# Patient Record
Sex: MALE | Race: WHITE | HISPANIC OR LATINO | Employment: FULL TIME | URBAN - METROPOLITAN AREA
[De-identification: names, ages, dates, MRNs, and addresses within clinical notes are randomized per-mention and may not be internally consistent; named-entity substitution may affect disease eponyms.]

---

## 2017-04-20 ENCOUNTER — OCCUPATIONAL MEDICINE (OUTPATIENT)
Dept: OCCUPATIONAL MEDICINE | Facility: CLINIC | Age: 27
End: 2017-04-20
Payer: OTHER GOVERNMENT

## 2017-04-20 ENCOUNTER — APPOINTMENT (OUTPATIENT)
Dept: OCCUPATIONAL MEDICINE | Facility: CLINIC | Age: 27
End: 2017-04-20
Payer: COMMERCIAL

## 2017-04-20 ENCOUNTER — APPOINTMENT (OUTPATIENT)
Dept: URGENT CARE | Facility: CLINIC | Age: 27
End: 2017-04-20
Payer: COMMERCIAL

## 2017-04-20 VITALS
HEIGHT: 72 IN | TEMPERATURE: 98.2 F | RESPIRATION RATE: 16 BRPM | OXYGEN SATURATION: 97 % | WEIGHT: 224 LBS | DIASTOLIC BLOOD PRESSURE: 82 MMHG | HEART RATE: 54 BPM | SYSTOLIC BLOOD PRESSURE: 124 MMHG | BODY MASS INDEX: 30.34 KG/M2

## 2017-04-20 DIAGNOSIS — S93.401A SPRAIN OF RIGHT ANKLE, UNSPECIFIED LIGAMENT, INITIAL ENCOUNTER: ICD-10-CM

## 2017-04-20 DIAGNOSIS — Z02.1 PRE-EMPLOYMENT DRUG SCREENING: ICD-10-CM

## 2017-04-20 LAB
AMP AMPHETAMINE: NORMAL
BAR BARBITURATES: NORMAL
BREATH ALCOHOL COMMENT: NORMAL
BZO BENZODIAZEPINES: NORMAL
COC COCAINE: NORMAL
INT CON NEG: NORMAL
INT CON POS: NORMAL
MDMA ECSTASY: NORMAL
MET METHAMPHETAMINES: NORMAL
MTD METHADONE: NORMAL
OPI OPIATES: NORMAL
OXY OXYCODONE: NORMAL
PCP PHENCYCLIDINE: NORMAL
POC BREATHALIZER: 0 PERCENT (ref 0–0.01)
POC URINE DRUG SCREEN OCDRS: NEGATIVE
THC: NORMAL

## 2017-04-20 PROCEDURE — E0114 CRUTCH UNDERARM PAIR NO WOOD: HCPCS | Mod: NU | Performed by: PREVENTIVE MEDICINE

## 2017-04-20 PROCEDURE — 82075 ASSAY OF BREATH ETHANOL: CPT | Performed by: PREVENTIVE MEDICINE

## 2017-04-20 PROCEDURE — 80305 DRUG TEST PRSMV DIR OPT OBS: CPT | Performed by: PREVENTIVE MEDICINE

## 2017-04-20 PROCEDURE — L4350 ANKLE CONTROL ORTHO PRE OTS: HCPCS | Mod: RT | Performed by: PREVENTIVE MEDICINE

## 2017-04-20 PROCEDURE — 99203 OFFICE O/P NEW LOW 30 MIN: CPT | Performed by: PREVENTIVE MEDICINE

## 2017-04-20 NOTE — PROGRESS NOTES
Subjective:      Leonard De La Vega is a 26 y.o. male who presents with Other      DOI 4/20/2017: Patient stepped out of work truck and caught his foot on the side walk and sprained his ankle. Patient noted immediate pain in the anterior aspect of the ankle. In mild swelling. Stated that he is able to ambulate initially but has not been able to put much weight on it since. He denies previous ankle injury. States the pain is constant and throbbing. Pain is worse with putting any weight on the foot. He has not taken any medications or tried any treatment. Denies any numbness or tingling.     Other        ROS  ROS: All systems were reviewed on intake form, form was reviewed and signed. See scanned documents in media. Pertinent positives and negatives included in HPI.    PMH: No pertinent past medical history to this problem  MEDS: Medications were reviewed in Epic  ALLERGIES: No Known Allergies  SOCHX: Works as Maintenance at [employer]  FH: No pertinent family history to this problem       Objective:     /82 mmHg  Pulse 54  Temp(Src) 36.8 °C (98.2 °F)  Resp 16  Ht 1.829 m (6')  Wt 101.606 kg (224 lb)  BMI 30.37 kg/m2  SpO2 97%     Physical Exam   Constitutional: He is oriented to person, place, and time. He appears well-developed and well-nourished.   Eyes: Conjunctivae and EOM are normal.   Cardiovascular: Normal rate.    Pulmonary/Chest: Effort normal. No respiratory distress.   Neurological: He is alert and oriented to person, place, and time.   Skin: Skin is warm and dry. No erythema.   Psychiatric: He has a normal mood and affect. Judgment normal.   Vitals reviewed.      Right ankle: There is small area of ecchymosis and swelling at the anterior aspect of the ankle. Tenderness to palpation in this area. No tenderness to palpation lateral malleolus, medial malleolus or fifth metatarsal. Full plantar flexion, but decreased dorsiflexion. Pain with plantar and dorsiflexion. No laxity with drawer  tests. Only able to provide a small amount of weight on foot.       Assessment/Plan:     1. Sprain of right ankle, unspecified ligament, initial encounter  No XR, did not meet [place] criteria  RICE treatment. Elevate and ice at least 4 times daily for the next few days.  Provided crutches and lace up ankle brace. Weight-bear as tolerated. Wean off crutches as soon as possible  OTC ibuprofen as needed for pain  Follow-up next Tuesday  Restricted duty

## 2017-04-20 NOTE — Clinical Note
EMPLOYEE’S CLAIM FOR COMPENSATION/ REPORT OF INITIAL TREATMENT  FORM C-4    EMPLOYEE’S CLAIM - PROVIDE ALL INFORMATION REQUESTED   First Name  Leonard Last Name  Ceferino Birthdate                    1990                Sex  male Claim Number   Home Address  [address] Age  26 y.o. 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 (if applicable)  getting ready for lunch    How did this injury or occupational disease occur? (Be specific an answer in detail. Use additional sheet if necessary)  I was rad reyes to go to lunch when step out of the truck, I step wrong on the side walk   If you believe that you have an occupational disease, when did you first have knowledge of the disability and it relationship to your employment?  none Witnesses to the Accident  [place]      Nature of Injury or Occupational Disease  Sprain  Part(s) of Body Injured or Affected  Foot (R), ,     I certify that the above is true and correct to the best of my knowledge and that I have provided this information in order to obtain the benefits of [place]  [place] or [place]).  I hereby authorize any physician, chiropractor, surgeon, practitioner, or other person, any hospital, including [facility] or [facility] hospital, any medical service organization, any insurance company, or other institution or organization to release to each other, any medical or other information, including benefits paid or payable, pertinent to this injury or disease, except information relative to diagnosis, treatment and/or counseling for AIDS, psychological conditions, alcohol or controlled substances, for which I must give specific authorization.  A Photostat of this authorization shall be as valid as the original.     Date   Place   Employee’s Signature   THIS REPORT MUST BE COMPLETED AND MAILED WITHIN 3 WORKING DAYS OF TREATMENT   Place  [facility]  Name of [facility]   Date  4/20/2017 Diagnosis  (S93.401A) Sprain of right ankle, unspecified ligament, initial encounter Is there evidence the injured employee was under the influence of alcohol and/or another controlled substance at the time of accident?   Hour  2:07 PM Description of Injury or Disease  The encounter diagnosis was Sprain of right ankle, unspecified ligament, initial encounter. No   Treatment  RICE, ibuprofen, crutches, ankle brace  Have you advised the patient to remain off work five days or more? No   X-Ray Findings      If Yes   From Date  To Date      From information given by the employee, together with medical evidence, can you directly connect this injury or occupational disease as job incurred?  Yes If No Full Duty  No Modified Duty  Yes   Is additional medical care by a physician indicated?  Yes If Modified Duty, Specify any Limitations / Restrictions  Limit 2 hours stand/walk. Allow to use crutches. Allow to ice/elevate leg as needed   Do you know of any previous injury or disease contributing to this condition  "or occupational disease?                            No   Date  4/20/2017 Print Doctor’s Name Ken Giles D.O. I certify the employer’s copy of  this form was mailed on:   Address  [address] Insurer’s Use Only     [place] Zip  [address]    Provider’s Tax ID Number  [id number]  Telephone  Dept: [phone]        e-SignTAYLORKEN D.O.   e-Signature: Dr. David Spain, Medical Director Degree  DO        ORIGINAL-TREATING PHYSICIAN OR CHIROPRACTOR    PAGE 2-INSURER/TPA    PAGE 3-EMPLOYER    PAGE 4-EMPLOYEE             Form C-4 (rev10/07)              BRIEF DESCRIPTION OF RIGHTS AND BENEFITS  (Pursuant to [place]RS 616C.050)    Notice of Injury or Occupational Disease (Incident Report Form C-1): If an injury or occupational disease (OD) arises out of and in the  course of employment, you must provide written notice to your employer as soon as practicable, but no later than 7 days after the accident or  OD. Your employer shall maintain a sufficient supply of the required forms.    Claim for Compensation (Form C-4): If medical treatment is sought, the form C-4 is available at the place of initial treatment. A completed  \"Claim for Compensation\" (Form C-4) must be filed within 90 days after an accident or OD. The treating physician or chiropractor must,  within 3 working days after treatment, complete and mail to the employer, the employer's insurer and third-party , the Claim for  Compensation.    Medical Treatment: If you require medical treatment for your on-the-job injury or OD, you may be required to select a physician or  chiropractor from a list provided by your workers’ compensation insurer, if it has contracted with an Organization for Managed Care (MCO) or  Preferred Provider Organization (PPO) or providers of health care. If your employer has not entered into a contract with an MCO or PPO, you  may select a physician or chiropractor from the Panel of Physicians and " Chiropractors. Any medical costs related to your industrial injury or  OD will be paid by your insurer.    Temporary Total Disability (TTD): If your doctor has certified that you are unable to work for a period of at least 5 consecutive days, or 5  cumulative days in a 20-day period, or places restrictions on you that your employer does not accommodate, you may be entitled to TTD  compensation.    Temporary Partial Disability (TPD): If the wage you receive upon reemployment is less than the compensation for TTD to which you are  entitled, the insurer may be required to pay you TPD compensation to make up the difference. TPD can only be paid for a maximum of 24  months.    Permanent Partial Disability (PPD): When your medical condition is stable and there is an indication of a PPD as a result of your injury or  OD, within 30 days, your insurer must arrange for an evaluation by a rating physician or chiropractor to determine the degree of your PPD. The  amount of your PPD award depends on the date of injury, the results of the PPD evaluation and your age and wage.    Permanent Total Disability (PTD): If you are medically certified by a treating physician or chiropractor as permanently and totally disabled  and have been granted a PTD status by your insurer, you are entitled to receive monthly benefits not to exceed 66 2/3% of your average  monthly wage. The amount of your PTD payments is subject to reduction if you previously received a PPD award.    Vocational Rehabilitation Services: You may be eligible for vocational rehabilitation services if you are unable to return to the job due to a  permanent physical impairment or permanent restrictions as a result of your injury or occupational disease.    Transportation and Per Gwyn Reimbursement: You may be eligible for travel expenses and per gwyn associated with medical treatment.    Reopening: You may be able to reopen your claim if your condition worsens after claim  closure.    Appeal Process: If you disagree with a written determination issued by the insurer or the insurer does not respond to your request, you may  appeal to the Department of Administration, , by following the instructions contained in your determination letter. You must  appeal the determination within 70 days from the date of the determination letter at [address], [address], [place], [place]  [address], or [address], [place], [place] [address]. If you disagree with the  decision, you may appeal to the  Department of Administration, . You must file your appeal within 30 days from the date of the  decision  letter at [address], [address], [place], [place] [address], or [address], [place], [place] [address]. If you  disagree with a decision of an , you may file a petition for judicial review with the District Court. You must do so within 30  days of the Appeal Officer’s decision. You may be represented by an  at your own expense or you may contact the [facility] for possible  representation.    [place]  for Injured Workers (NAIW): If you disagree with a  decision, you may request that NAIW represent you  without charge at an  Hearing. For information regarding denial of benefits, you may contact the [facility] at: [address], [place] [address], [phone], or [address], [place] [address], [phone]) [phone]    To File a Complaint with the Division: If you wish to file a complaint with the  of the Division of Industrial Relations (DIR),  please contact the Workers’ Compensation Section, [address], [address], [place], [place] [address], telephone (006) [phone], or  [address], [place] [address], telephone ([phone]) [phone].    For  assistance with Workers’ Compensation Issues: you may contact the Office of the Governor Consumer Health Assistance, [address] [place] [address], [place] [address], [place] [address], Toll Free 1-118.515.1179, Web site: http://govcha.[facility].[place]., E-mail  [email or web address].[place].                                                                                                                                                                                                                                   __________________________________________________________________                                                                   _________________                Employee Name / Signature                                                                                                                                                       Date                                                                                                                                                                                                     D-2 (rev. 10/07)

## 2017-04-20 NOTE — Clinical Note
[address],   [address] [place] [address]  Phone: [phone] - Fax: [phone]        Occupational Health [facility] Progress Report and Disability Certification  Date of Service: 4/20/2017   No Show:  No  Date / Time of Next Visit: 4/25/2017 @ 10:50am   Claim Information   Patient Name: Leonard De La Vega  Claim Number:     Employer: [employer]  Date of Injury: 4/20/2017     Insurer / TPA: [employer]  ID / SSN:     Occupation: Maintenance  Diagnosis: The encounter diagnosis was Sprain of right ankle, unspecified ligament, initial encounter.    Medical Information   Related to Industrial Injury? Yes    Subjective Complaints:  DOI 4/20/2017: Patient stepped out of work truck and caught his foot on the side walk and sprained his ankle. Patient noted immediate pain in the anterior aspect of the ankle. In mild swelling. Stated that he is able to ambulate initially but has not been able to put much weight on it since. He denies previous ankle injury. States the pain is constant and throbbing. Pain is worse with putting any weight on the foot. He has not taken any medications or tried any treatment. Denies any numbness or tingling.   Objective Findings: Right ankle: There is small area of ecchymosis and swelling at the anterior aspect of the ankle. Tenderness to palpation in this area. No tenderness to palpation lateral malleolus, medial malleolus or fifth metatarsal. Full plantar flexion, but decreased dorsiflexion. Pain with plantar and dorsiflexion. No laxity with drawer tests. Only able to provide a small amount of weight on foot.   Pre-Existing Condition(s):     Assessment:   Initial Visit    Status: Additional Care Required  Permanent Disability:No    Plan:      Diagnostics:      Comments:  No XR, did not meet [place] criteria  RICE treatment. Elevate and ice at least 4 times daily for the next few days.  Provided crutches and lace up ankle brace.  Weight-bear as tolerated. Wean off crutches as soon as possible  OTC ibuprofen as needed fo  r pain  Follow-up next Tuesday  Restricted duty    Disability Information   Status: Released to Restricted Duty    From:  2017  Through: 2017 Restrictions are:     Physical Restrictions   Sitting:    Standing:  < or = to 2 hrs/day Stooping:    Bending:      Squatting:    Walking:  < or = to 2 hrs/day Climbin hrs/day Pushing:      Pulling:    Other:    Reaching Above Shoulder (L):   Reaching Above Shoulder (R):       Reaching Below Shoulder (L):    Reaching Below Shoulder (R):      Not to exceed Weight Limits   Carrying(hrs):   Weight Limit(lb):   Lifting(hrs):   Weight  Limit(lb):     Comments: Allow to use crutches at work as needed. Allow to sit/stand as needed for comfort. Allow to elevate/ice foot as needed to reduce swelling.    Repetitive Actions   Hands: i.e. Fine Manipulations from Grasping:     Feet: i.e. Operating Foot Controls:     Driving / Operate Machinery:     Physician Name: Ken Giles D.O. Physician Signature: KEN Paez D.O. e-Signature: Dr. David Spain, Medical Director   Clinic Name / Location: [address],   [address]  [place] [address] Clinic Phone Number: Dept: [phone]   Appointment Time: 3:00 Pm Visit Start Time: 2:07 PM   Check-In Time:  1:55 Pm Visit Discharge Time:  3:02pm   Original-Treating Physician or Chiropractor    Page 2-Insurer/TPA    Page 3-Employer    Page 4-Employee

## 2017-04-20 NOTE — MR AVS SNAPSHOT
Leonard De La Vega   2017 3:00 PM   Occupational Medicine   MRN: [id number]    Department:  [facility]   Dept Phone:  [phone]    Description:  Male : 1990   Provider:  Ken Giles D.O.           Reason for Visit     Other ROOM #1 NEW WC (R) Ankle DOI 17       Allergies as of 2017     No Known Allergies      You were diagnosed with     Sprain of right ankle, unspecified ligament, initial encounter   [[id number]]         Vital Signs     Blood Pressure Pulse Temperature Respirations Height Weight    124/82 mmHg 54 36.8 °C (98.2 °F) 16 1.829 m (6') 101.606 kg (224 lb)    Body Mass Index Oxygen Saturation                30.37 kg/m2 97%          Basic Information     Date Of Birth Sex Race Ethnicity Preferred Language    1990 Male Unable to Obtain  Origin (Canadian,Central African,Guyanese,Bahraini, etc) English      Your appointments     2017  3:00 PM   Workers Compensation (Long) with Ken Giles D.O.   [address] [address]   [phone]              Health Maintenance        Date Due Completion Dates    IMM HEP B VACCINE (1 of 3 - Primary Series) 1991 ---    IMM HEP A VACCINE (1 of 2 - Standard Series) 1992 ---    IMM HPV VACCINE (1 of 3 - Male 3 Dose Series) 2002 ---    IMM VARICELLA (CHICKENPOX) VACCINE (1 of 2 - 2 Dose Adolescent Series) 2004 ---    IMM DTaP/Tdap/Td Vaccine (2 - Td) 2026            Current Immunizations     Tdap Vaccine 2016  9:30 AM      Below and/or attached are the medications your provider expects you to take. Review all of your home medications and newly ordered medications with your provider and/or pharmacist. Follow medication instructions as directed by your provider and/or pharmacist. Please keep your medication list with you and share with your provider. Update the information when medications are discontinued, doses are changed,  or new medications (including over-the-counter products) are added; and carry medication information at all times in the event of emergency situations     Allergies:  No Known Allergies          Medications  Valid as of: April 20, 2017 -  2:43 PM    Generic Name Brand Name Tablet Size Instructions for use    .                 Medicines prescribed today were sent to:     [facility], [place] - [address] [facility] [facility] [address]    [address] [facility] [place] [address]    Phone: [phone] Fax: [phone]    Open 24 Hours?: Yes      Medication refill instructions:       If your prescription bottle indicates you have medication refills left, it is not necessary to call your provider’s office. Please contact your pharmacy and they will refill your medication.    If your prescription bottle indicates you do not have any refills left, you may request refills at any time through one of the following ways: The online [facility] system (except Urgent Care), by calling your provider’s office, or by asking your pharmacy to contact your provider’s office with a refill request. Medication refills are processed only during regular business hours and may not be available until the next business day. Your provider may request additional information or to have a follow-up visit with you prior to refilling your medication.   *Please Note: Medication refills are assigned a new Rx number when refilled electronically. Your pharmacy may indicate that no refills were authorized even though a new prescription for the same medication is available at the pharmacy. Please request the medicine by name with the pharmacy before contacting your provider for a refill.           MyChart Status: Patient Declined

## 2017-04-25 ENCOUNTER — OCCUPATIONAL MEDICINE (OUTPATIENT)
Dept: OCCUPATIONAL MEDICINE | Facility: CLINIC | Age: 27
End: 2017-04-25
Payer: OTHER GOVERNMENT

## 2017-04-25 VITALS
WEIGHT: 224 LBS | TEMPERATURE: 97.9 F | BODY MASS INDEX: 31.36 KG/M2 | HEIGHT: 71 IN | RESPIRATION RATE: 14 BRPM | OXYGEN SATURATION: 98 % | HEART RATE: 88 BPM | DIASTOLIC BLOOD PRESSURE: 82 MMHG | SYSTOLIC BLOOD PRESSURE: 140 MMHG

## 2017-04-25 DIAGNOSIS — S93.401A SPRAIN OF RIGHT ANKLE, UNSPECIFIED LIGAMENT, INITIAL ENCOUNTER: ICD-10-CM

## 2017-04-25 PROCEDURE — 99212 OFFICE O/P EST SF 10 MIN: CPT | Performed by: PREVENTIVE MEDICINE

## 2017-04-25 NOTE — MR AVS SNAPSHOT
"        Leonard De La Vega   2017 10:50 AM   Occupational Medicine   MRN: [id number]    Department:  [facility]   Dept Phone:  [phone]    Description:  Male : 1990   Provider:  Ken Giles D.O.           Reason for Visit     Other WC FV DOI 17 (R) foot better, room 3      Allergies as of 2017     No Known Allergies      You were diagnosed with     Sprain of right ankle, unspecified ligament, initial encounter   [[id number]]         Vital Signs     Blood Pressure Pulse Temperature Respirations Height Weight    140/82 mmHg 88 36.6 °C (97.9 °F) 14 1.803 m (5' 10.98\") 101.606 kg (224 lb)    Body Mass Index Oxygen Saturation                31.26 kg/m2 98%          Basic Information     Date Of Birth Sex Race Ethnicity Preferred Language    1990 Male Unable to Obtain  Origin (Mongolian,Lao,Burundian,Harshad, etc) English      Health Maintenance        Date Due Completion Dates    IMM HEP B VACCINE (1 of 3 - Primary Series) 1991 ---    IMM HEP A VACCINE (1 of 2 - Standard Series) 1992 ---    IMM HPV VACCINE (1 of 3 - Male 3 Dose Series) 2002 ---    IMM VARICELLA (CHICKENPOX) VACCINE (1 of 2 - 2 Dose Adolescent Series) 2004 ---    IMM DTaP/Tdap/Td Vaccine (2 - Td) 2026            Current Immunizations     Tdap Vaccine 2016  9:30 AM      Below and/or attached are the medications your provider expects you to take. Review all of your home medications and newly ordered medications with your provider and/or pharmacist. Follow medication instructions as directed by your provider and/or pharmacist. Please keep your medication list with you and share with your provider. Update the information when medications are discontinued, doses are changed, or new medications (including over-the-counter products) are added; and carry medication information at all times in the event of emergency situations     Allergies:  No Known Allergies          Medications  " Valid as of: April 25, 2017 - 11:25 AM    Generic Name Brand Name Tablet Size Instructions for use    .                 Medicines prescribed today were sent to:     [facility], [place] - [address]    [address] [facility] [place] [address]    Phone: [phone] Fax: [phone]    Open 24 Hours?: Yes      Medication refill instructions:       If your prescription bottle indicates you have medication refills left, it is not necessary to call your provider’s office. Please contact your pharmacy and they will refill your medication.    If your prescription bottle indicates you do not have any refills left, you may request refills at any time through one of the following ways: The online Doctolib system (except Urgent Care), by calling your provider’s office, or by asking your pharmacy to contact your provider’s office with a refill request. Medication refills are processed only during regular business hours and may not be available until the next business day. Your provider may request additional information or to have a follow-up visit with you prior to refilling your medication.   *Please Note: Medication refills are assigned a new Rx number when refilled electronically. Your pharmacy may indicate that no refills were authorized even though a new prescription for the same medication is available at the pharmacy. Please request the medicine by name with the pharmacy before contacting your provider for a refill.           Doctolib Access Code: [email or web address]  Expires: 5/25/2017 10:44 AM    Doctolib  A secure, online tool to manage your health information     [employer]’s Doctolib® is a secure, online tool that connects you to your personalized health information from the privacy of your home -- day or night - making it very easy for you to manage your healthcare. Once the activation process is completed, you can even access your medical information using the Doctolib  adrian, which is available for free in the Apple Adrian store or Google Play store.     ProtoExchange provides the following levels of access (as shown below):   My Chart Features   [facility] Doctor [facility]  Specialists [facility]  Urgent  Care Non-[facility]  Primary Care  Doctor   Email your healthcare team securely and privately 24/7 X X X    Manage appointments: schedule your next appointment; view details of past/upcoming appointments X      Request prescription refills. X      View recent personal medical records, including lab and immunizations X X X X   View health record, including health history, allergies, medications X X X X   Read reports about your outpatient visits, procedures, consult and ER notes X X X X   See your discharge summary, which is a recap of your hospital and/or ER visit that includes your diagnosis, lab results, and care plan. X X       How to register for ProtoExchange:  1. Go to  [email or web address].  2. Click on the Sign Up Now box, which takes you to the New Member Sign Up page. You will need to provide the following information:  a. Enter your ProtoExchange Access Code exactly as it appears at the top of this page. (You will not need to use this code after you’ve completed the sign-up process. If you do not sign up before the expiration date, you must request a new code.)   b. Enter your date of birth.   c. Enter your home email address.   d. Click Submit, and follow the next screen’s instructions.  3. Create a ProtoExchange ID. This will be your ProtoExchange login ID and cannot be changed, so think of one that is secure and easy to remember.  4. Create a ProtoExchange password. You can change your password at any time.  5. Enter your Password Reset Question and Answer. This can be used at a later time if you forget your password.   6. Enter your e-mail address. This allows you to receive e-mail notifications when new information is available in ProtoExchange.  7. Click Sign Up. You can now view your health  information.    For assistance activating your [employer] account, call [phone]

## 2017-04-25 NOTE — PROGRESS NOTES
"Subjective:      Leonard De La Vega is a 26 y.o. male who presents with Other      DOI 4/20/2017: Patient stepped out of work truck and caught his foot on the side walk and sprained his ankle. Patient states this pain is mostly improved. He states he is able to walk with minimal difficulty. He does note that he has increased pain if he is lifting and walking something at the same time. He took ibuprofen for the first few days but has not recorded in the last few days. Denies any numbness or tingling.     Other        ROS       Objective:     /82 mmHg  Pulse 88  Temp(Src) 36.6 °C (97.9 °F)  Resp 14  Ht 1.803 m (5' 10.98\")  Wt 101.606 kg (224 lb)  BMI 31.26 kg/m2  SpO2 98%     Physical Exam    Right ankle: There is small area of ecchymosis and swelling at the anterior aspect of the ankle. Tenderness to palpation in this area. No tenderness to palpation lateral malleolus, medial malleolus or fifth metatarsal. Full plantar flexion, but decreased dorsiflexion. Pain with plantar and dorsiflexion. No laxity with drawer tests. Only able to provide a small amount of weight on foot.       Assessment/Plan:     1. Sprain of right ankle, unspecified ligament, initial encounter  Restricted duty until Thursday, may begin full duty on Thursday  Released from care  Expect MMI and complete resolution of symptoms next 1-2 weeks, symptoms do not resolve, worsen or unable to tolerate full duty return to clinic          "

## 2017-04-25 NOTE — Clinical Note
[address],   [address] [place] [address]  Phone: [phone] - Fax: [phone]        Occupational Health [facility] Progress Report and Disability Certification  Date of Service: 4/25/2017   No Show:  No  Date / Time of Next Visit:  MMI   Claim Information   Patient Name: Leonard De La Vega  Claim Number:     Employer: [employer]  Date of Injury: 4/20/2017     Insurer / TPA: [employer]  ID / SSN:     Occupation: Maintenance  Diagnosis: The encounter diagnosis was Sprain of right ankle, unspecified ligament, initial encounter.    Medical Information   Related to Industrial Injury? Yes    Subjective Complaints:  DOI 4/20/2017: Patient stepped out of work truck and caught his foot on the side walk and sprained his ankle. Patient states this pain is mostly improved. He states he is able to walk with minimal difficulty. He does note that he has increased pain if he is lifting and walking something at the same time. He took ibuprofen for the first few days but has not recorded in the last few days. Denies any numbness or tingling.   Objective Findings: Right ankle: There is small area of ecchymosis and swelling at the anterior aspect of the ankle. Tenderness to palpation in this area. No tenderness to palpation lateral malleolus, medial malleolus or fifth metatarsal. Full plantar flexion, but decreased dorsiflexion. Pain with plantar and dorsiflexion. No laxity with drawer tests. Only able to provide a small amount of weight on foot.   Pre-Existing Condition(s):     Assessment:   Condition Improved    Status: Discharged /  MMI  Permanent Disability:No    Plan:      Diagnostics:      Comments:  Restricted duty until Thursday, may begin full duty on Thursday  Released from care  Expect MMI and complete resolution of symptoms next 1-2 weeks, symptoms do not resolve, worsen or unable to tolerate full duty return to clinic    Disability Information   Status:  Released to Restricted Duty    From:  2017  Through:   Restrictions are: Temporary  Comments:restrictions until , may begin full duty    Physical Restrictions   Sitting:    Standing:  < or = to 2 hrs/day Stooping:    Bending:      Squatting:    Walking:  < or = to 2 hrs/day Climbin hrs/day Pushing:      Pulling:    Other:    Reaching Above Shoulder (L):   Reaching Above Shoulder (R):       Reaching Below Shoulder (L):    Reaching Below Shoulder (R):      Not to exceed Weight Limits   Carrying(hrs):   Weight Limit(lb): < or = to 10 pounds Lifting(hrs):   Weight  Limit(lb): < or = to 10 pounds   Comments: restrictions until , may begin full duty     Repetitive Actions   Hands: i.e. Fine Manipulations from Grasping:     Feet: i.e. Operating Foot Controls:     Driving / Operate Machinery:     Physician Name: Ken Giles D.O. Physician Signature: KEN Paez D.O. e-Signature: Dr. David Spain, Medical Director   Clinic Name / Location: [address],   [address]  [place] [address] Clinic Phone Number: Dept: [phone]   Appointment Time: 10:50 Am Visit Start Time: 11:14 AM   Check-In Time:  10:45 Am Visit Discharge Time:  11:24 AM   Original-Treating Physician or Chiropractor    Page 2-Insurer/TPA    Page 3-Employer    Page 4-Employee

## 2017-06-26 ENCOUNTER — NON-PROVIDER VISIT (OUTPATIENT)
Dept: OCCUPATIONAL MEDICINE | Facility: CLINIC | Age: 27
End: 2017-06-26

## 2017-06-26 DIAGNOSIS — Z02.1 PRE-EMPLOYMENT DRUG SCREENING: ICD-10-CM

## 2017-06-26 LAB
AMP AMPHETAMINE: NORMAL
COC COCAINE: NORMAL
INT CON NEG: NORMAL
INT CON POS: NORMAL
MET METHAMPHETAMINES: NORMAL
OPI OPIATES: NORMAL
PCP PHENCYCLIDINE: NORMAL
POC DRUG COMMENT 753798-OCCUPATIONAL HEALTH: NEGATIVE
THC: NORMAL

## 2017-06-26 PROCEDURE — 80305 DRUG TEST PRSMV DIR OPT OBS: CPT | Performed by: PREVENTIVE MEDICINE

## 2017-06-26 NOTE — MR AVS SNAPSHOT
Leonard De La Vega   2017 4:00 PM   Non-Provider Visit   MRN: [id number]    Department:  [facility]   Dept Phone:  [phone]    Description:  Male : 1990   Provider:  DC MCHUGH MA           Reason for Visit     Drug / Alcohol Assessment [facility] const.      Allergies as of 2017     No Known Allergies      You were diagnosed with     Pre-employment drug screening   [[id number]]         Basic Information     Date Of Birth Sex Race Ethnicity Preferred Language    1990 Male Unable to Obtain  Origin (Luxembourgish,Turks and Ca[place] Islander,Bahraini,Taiwanese, etc) English      Health Maintenance        Date Due Completion Dates    IMM HEP B VACCINE (1 of 3 - Primary Series) 1991 ---    IMM HEP A VACCINE (1 of 2 - Standard Series) 1992 ---    IMM HPV VACCINE (1 of 3 - Male 3 Dose Series) 2002 ---    IMM VARICELLA (CHICKENPOX) VACCINE (1 of 2 - 2 Dose Adolescent Series) 2004 ---    IMM DTaP/Tdap/Td Vaccine (2 - Td) 2026            Results     POCT 6 Panel Urine Drug Screen      Component    AMPHETAMINE    POC THC    COCAINE    OPIATES    PHENCYCLIDINE    METHAMPHETAMINES    POC Urine Drug Screen Comment    negative    Internal Control Positive    Valid    Internal Control Negative    Valid                        Current Immunizations     Tdap Vaccine 2016  9:30 AM      Below and/or attached are the medications your provider expects you to take. Review all of your home medications and newly ordered medications with your provider and/or pharmacist. Follow medication instructions as directed by your provider and/or pharmacist. Please keep your medication list with you and share with your provider. Update the information when medications are discontinued, doses are changed, or new medications (including over-the-counter products) are added; and carry medication information at all times in the event of emergency situations     Allergies:  No Known Allergies             Medications  Valid as of: June 26, 2017 -  4:25 PM    Generic Name Brand Name Tablet Size Instructions for use    .                 Medicines prescribed today were sent to:     [facility], [place] - [address]    [address] [place] [address]    Phone: [phone] Fax: [phone]    Open 24 Hours?: Yes      Medication refill instructions:       If your prescription bottle indicates you have medication refills left, it is not necessary to call your provider’s office. Please contact your pharmacy and they will refill your medication.    If your prescription bottle indicates you do not have any refills left, you may request refills at any time through one of the following ways: The online FNZ system (except Urgent Care), by calling your provider’s office, or by asking your pharmacy to contact your provider’s office with a refill request. Medication refills are processed only during regular business hours and may not be available until the next business day. Your provider may request additional information or to have a follow-up visit with you prior to refilling your medication.   *Please Note: Medication refills are assigned a new Rx number when refilled electronically. Your pharmacy may indicate that no refills were authorized even though a new prescription for the same medication is available at the pharmacy. Please request the medicine by name with the pharmacy before contacting your provider for a refill.           FNZ Access Code: [email or web address]  Expires: 7/26/2017  3:55 PM    FNZ  A secure, online tool to manage your health information     [employer]’s FNZ® is a secure, online tool that connects you to your personalized health information from the privacy of your home -- day or night - making it very easy for you to manage your healthcare. Once the activation process is completed, you can even access your medical information  using the [employer] adrian, which is available for free in the Apple Adrian store or Google Play store.     [employer] provides the following levels of access (as shown below):   My Chart Features   [facility] Primary [facility] Doctor [facility]  Specialists [facility]  Urgent  Care Non-[facility]  Primary Care  Doctor   Email your healthcare team securely and privately 24/7 X X X    Manage appointments: schedule your next appointment; view details of past/upcoming appointments X      Request prescription refills. X      View recent personal medical records, including lab and immunizations X X X X   View health record, including health history, allergies, medications X X X X   Read reports about your outpatient visits, procedures, consult and ER notes X X X X   See your discharge summary, which is a recap of your hospital and/or ER visit that includes your diagnosis, lab results, and care plan. X X       How to register for Usbek & Rica:  1. Go to  [email or web address].  2. Click on the Sign Up Now box, which takes you to the New Member Sign Up page. You will need to provide the following information:  a. Enter your Usbek [employer] Rica Access Code exactly as it appears at the top of this page. (You will not need to use this code after you’ve completed the sign-up process. If you do not sign up before the expiration date, you must request a new code.)   b. Enter your date of birth.   c. Enter your home email address.   d. Click Submit, and follow the next screen’s instructions.  3. Create a Usbek & Rica ID. This will be your Usbek [employer] Rica login ID and cannot be changed, so think of one that is secure and easy to remember.  4. Create a Us[employer] Rica password. You can change your password at any time.  5. Enter your Password Reset Question and Answer. This can be used at a later time if you forget your password.   6. Enter your e-mail address. This allows you to receive e-mail notifications when new information is available in Usbek [employer] Rica.  7. Click Sign Up. You can now view your  health information.    For assistance activating your [email or web address] account, call [phone]

## 2017-09-19 ENCOUNTER — NON-PROVIDER VISIT (OUTPATIENT)
Dept: URGENT CARE | Facility: CLINIC | Age: 27
End: 2017-09-19

## 2017-09-19 DIAGNOSIS — Z02.1 PRE-EMPLOYMENT DRUG SCREENING: ICD-10-CM

## 2017-09-19 LAB
AMP AMPHETAMINE: NORMAL
COC COCAINE: NORMAL
INT CON NEG: NORMAL
INT CON POS: NORMAL
MET METHAMPHETAMINES: NORMAL
OPI OPIATES: NORMAL
PCP PHENCYCLIDINE: NORMAL
POC DRUG COMMENT 753798-OCCUPATIONAL HEALTH: NORMAL
THC: NORMAL

## 2017-09-19 PROCEDURE — 80305 DRUG TEST PRSMV DIR OPT OBS: CPT | Performed by: PHYSICIAN ASSISTANT

## 2020-11-06 ENCOUNTER — NON-PROVIDER VISIT (OUTPATIENT)
Dept: URGENT CARE | Facility: PHYSICIAN GROUP | Age: 30
End: 2020-11-06

## 2020-11-06 DIAGNOSIS — Z02.1 PRE-EMPLOYMENT DRUG SCREENING: ICD-10-CM

## 2020-11-06 PROCEDURE — 80305 DRUG TEST PRSMV DIR OPT OBS: CPT | Performed by: FAMILY MEDICINE

## 2021-02-22 ENCOUNTER — APPOINTMENT (OUTPATIENT)
Dept: RADIOLOGY | Facility: MEDICAL CENTER | Age: 31
End: 2021-02-22
Attending: EMERGENCY MEDICINE
Payer: MEDICAID

## 2021-02-22 ENCOUNTER — HOSPITAL ENCOUNTER (EMERGENCY)
Facility: MEDICAL CENTER | Age: 31
End: 2021-02-22
Attending: EMERGENCY MEDICINE
Payer: MEDICAID

## 2021-02-22 VITALS
WEIGHT: 248.9 LBS | HEIGHT: 71 IN | TEMPERATURE: 97 F | HEART RATE: 74 BPM | RESPIRATION RATE: 16 BRPM | DIASTOLIC BLOOD PRESSURE: 114 MMHG | OXYGEN SATURATION: 93 % | SYSTOLIC BLOOD PRESSURE: 173 MMHG | BODY MASS INDEX: 34.85 KG/M2

## 2021-02-22 DIAGNOSIS — S52.502A CLOSED FRACTURE OF DISTAL END OF LEFT RADIUS, UNSPECIFIED FRACTURE MORPHOLOGY, INITIAL ENCOUNTER: ICD-10-CM

## 2021-02-22 PROCEDURE — 73110 X-RAY EXAM OF WRIST: CPT | Mod: LT

## 2021-02-22 PROCEDURE — 302875 HCHG BANDAGE ACE 4 OR 6""

## 2021-02-22 PROCEDURE — 29125 APPL SHORT ARM SPLINT STATIC: CPT

## 2021-02-22 PROCEDURE — 99283 EMERGENCY DEPT VISIT LOW MDM: CPT

## 2021-02-22 NOTE — ED TRIAGE NOTES
Pt comes in complaining of a fall yesterday. Pt stating he fell onto his left arm. Pt sent here by his employer.

## 2021-02-22 NOTE — ED PROVIDER NOTES
"ED Provider Note      CHIEF COMPLAINT   Left wrist pain    HPI   Leonard De La Vega is a 30 y.o. male who presents with left wrist pain.  Fell down 1 step at work yesterday.  Throbbing pain more over the ulnar aspect of the wrist.  Nonradiating worse with movement.  No numbness tingling weakness.  No laceration.    REVIEW OF SYSTEMS   Pertinent negative: As above    PAST MEDICAL HISTORY   History reviewed. No pertinent past medical history.    SOCIAL HISTORY  Social History     Tobacco Use   • Smoking status: Current Some Day Smoker   Substance Use Topics   • Alcohol use: Not Currently   • Drug use: Not Currently       ALLERGIES   See chart    PHYSICAL EXAM  VITAL SIGNS: BP (!) 166/100   Pulse 79   Temp 36.1 °C (97 °F) (Temporal)   Resp 18   Ht 1.803 m (5' 11\")   Wt 113 kg (248 lb 14.4 oz)   SpO2 94%   BMI 34.71 kg/m²   Head: Atraumatic  Eyes: Eyes normal inspection  Neck: has full range of motion, normal inspection.  Constitutional: No acute distress   Cardiovascular: Normal heart rate. Pulses strong radial  Thorax & Lungs: No respiratory distress  Skin: Intact  Musculoskeletal: Tender distal ulna and radius on the left.  No deformity.  Compartments soft.  Neurologic:  Normal sensory and motor    RADIOLOGY/PROCEDURES  DX-WRIST-COMPLETE 3+ LEFT   Final Result      1.  Acute relatively nondisplaced fracture of the radial aspect of the distal metaphysis of the left radius. There is intra-articular extension of the fracture.      2.  No distal ulnar fracture or carpal fracture identified.      3.  No dislocation.         Imaging is interpreted by radiologist     COURSE & MEDICAL DECISION MAKING  Analgesia for possible fracture-patient declined    Patient presents with left wrist pain.  X-rays obtained demonstrating nondisplaced fracture.  Placed in a left sugar tong.  Will follow up with orthopedics.  Referred to the [facility], Dr. Adam.  Patient to take Tylenol as needed.  Return to ER if he " notices other area of injury uncontrolled symptoms or concern.    FINAL IMPRESSION  1.  Left distal radius fracture      This dictation was created using voice recognition software. The accuracy of the dictation is limited to the abilities of the software. I expect there may be some errors of grammar and possibly content. The nursing notes were reviewed and certain aspects of this information were incorporated into this note.      Electronically signed by: Rafy Ma M.D., 2/22/2021 1:04 PM

## 2021-02-22 NOTE — ED NOTES
Patient d/c off unit in stable condition, significant education provided regarding splint and follow up care. Education provided using teach back method. All questions and concerns addressed at this time

## 2021-02-22 NOTE — ED NOTES
Patient ambulated back from triage with steady gait, vital sign monitoring applied. Updated on plan of care